# Patient Record
Sex: MALE | URBAN - METROPOLITAN AREA
[De-identification: names, ages, dates, MRNs, and addresses within clinical notes are randomized per-mention and may not be internally consistent; named-entity substitution may affect disease eponyms.]

---

## 2017-01-14 ENCOUNTER — OFFICE VISIT (OUTPATIENT)
Dept: AUDIOLOGY | Facility: CLINIC | Age: 57
End: 2017-01-14

## 2017-01-14 ENCOUNTER — OFFICE VISIT (OUTPATIENT)
Dept: OTOLARYNGOLOGY | Facility: CLINIC | Age: 57
End: 2017-01-14

## 2017-01-14 VITALS
DIASTOLIC BLOOD PRESSURE: 80 MMHG | SYSTOLIC BLOOD PRESSURE: 118 MMHG | BODY MASS INDEX: 24 KG/M2 | TEMPERATURE: 98 F | WEIGHT: 148 LBS

## 2017-01-14 DIAGNOSIS — H91.8X2 OTHER SPECIFIED HEARING LOSS OF LEFT EAR, UNSPECIFIED HEARING STATUS ON CONTRALATERAL SIDE: Primary | ICD-10-CM

## 2017-01-14 DIAGNOSIS — H93.12 TINNITUS, LEFT: ICD-10-CM

## 2017-01-14 DIAGNOSIS — H93.12 LEFT-SIDED TINNITUS: Primary | ICD-10-CM

## 2017-01-14 PROCEDURE — 99212 OFFICE O/P EST SF 10 MIN: CPT | Performed by: OTOLARYNGOLOGY

## 2017-01-14 PROCEDURE — 92557 COMPREHENSIVE HEARING TEST: CPT | Performed by: AUDIOLOGIST

## 2017-01-14 PROCEDURE — 99243 OFF/OP CNSLTJ NEW/EST LOW 30: CPT | Performed by: OTOLARYNGOLOGY

## 2017-01-14 RX ORDER — AZITHROMYCIN 250 MG/1
TABLET, FILM COATED ORAL
Refills: 0 | COMMUNITY
Start: 2017-01-03 | End: 2017-01-26 | Stop reason: ALTCHOICE

## 2017-01-14 RX ORDER — ATORVASTATIN CALCIUM 10 MG/1
5 TABLET, FILM COATED ORAL
Refills: 1 | COMMUNITY
Start: 2017-01-03

## 2017-01-14 RX ORDER — AMOXICILLIN AND CLAVULANATE POTASSIUM 875; 125 MG/1; MG/1
1 TABLET, FILM COATED ORAL EVERY 12 HOURS
Qty: 20 TABLET | Refills: 0 | Status: SHIPPED | OUTPATIENT
Start: 2017-01-14 | End: 2017-01-26 | Stop reason: ALTCHOICE

## 2017-01-14 NOTE — PROGRESS NOTES
AUDIOGRAM     Dejon Dowd was referred for testing by No ref. provider found. 4/7/1960  [id number]    History: Constant tinnitus in left ear for 12 days. He had an ear infection, still has some ear pain, left ear.   Otoscopic Inspection:  both ears: no

## 2017-01-16 NOTE — PATIENT INSTRUCTIONS
Understanding Hearing Loss    As you age, some hearing loss is normal. But long-term exposure to loud noise can speed up the loss. You lose more than the ability to hear how loud a sound is. You also lose the ability to hear certain types of sounds.  For

## 2017-01-16 NOTE — PROGRESS NOTES
Jason Flynn is a 49year old male. Patient presents with:  Ear Problem: Ringing L ear x 12 days. HPI:   He had an infectio with pain and drainage from the ear. There was bloody drainag Drainage has now sere is ringin.     Current Outpatient Prescription Submental. Submandibular. Anterior cervical. Posterior cervical. Supraclavicular.    Eyes Normal Conjunctiva - Right: Normal, Left: Normal. Pupil - Right: Normal, Left: Normal.    Ears Normal Inspection - Right: Normal, Left: Normal. Canal - Left: Normal. T

## 2017-01-18 ENCOUNTER — TELEPHONE (OUTPATIENT)
Dept: OTOLARYNGOLOGY | Facility: CLINIC | Age: 57
End: 2017-01-18

## 2017-01-18 NOTE — TELEPHONE ENCOUNTER
, pt seen on acute otitis media with perforation left ear on 01/14. Pt states taking antibiotics as prescribed and no improvement,r. Pt states you mentioned during last visit about removing fluid, myringotomy.  Please advise if pt should schedule re

## 2017-01-18 NOTE — TELEPHONE ENCOUNTER
Pt. States that he has been taking the antibiotic for the past week, but the ear infection is not getting better. Please call to discuss.

## 2017-01-19 ENCOUNTER — OFFICE VISIT (OUTPATIENT)
Dept: OTOLARYNGOLOGY | Facility: CLINIC | Age: 57
End: 2017-01-19

## 2017-01-19 ENCOUNTER — OFFICE VISIT (OUTPATIENT)
Dept: AUDIOLOGY | Facility: CLINIC | Age: 57
End: 2017-01-19

## 2017-01-19 VITALS
DIASTOLIC BLOOD PRESSURE: 80 MMHG | TEMPERATURE: 99 F | SYSTOLIC BLOOD PRESSURE: 130 MMHG | WEIGHT: 148 LBS | BODY MASS INDEX: 24 KG/M2

## 2017-01-19 DIAGNOSIS — H69.92 EUSTACHIAN TUBE DISORDER, LEFT: Primary | ICD-10-CM

## 2017-01-19 DIAGNOSIS — H91.8X2 OTHER SPECIFIED HEARING LOSS OF LEFT EAR, UNSPECIFIED HEARING STATUS ON CONTRALATERAL SIDE: Primary | ICD-10-CM

## 2017-01-19 PROCEDURE — 99213 OFFICE O/P EST LOW 20 MIN: CPT | Performed by: OTOLARYNGOLOGY

## 2017-01-19 PROCEDURE — 92567 TYMPANOMETRY: CPT | Performed by: AUDIOLOGIST

## 2017-01-19 PROCEDURE — 99212 OFFICE O/P EST SF 10 MIN: CPT | Performed by: OTOLARYNGOLOGY

## 2017-01-19 RX ORDER — CEFDINIR 300 MG/1
CAPSULE ORAL
Refills: 0 | COMMUNITY
Start: 2017-01-15 | End: 2017-01-26 | Stop reason: ALTCHOICE

## 2017-01-19 NOTE — TELEPHONE ENCOUNTER
, pt informed of your recommendations below, pt states ear is very uncomfortable, causing him to wake up in the middle of night,pt asking if he come tomorrow morning?  I tried advising that would be to soon per your recommendations and tried schedul

## 2017-01-19 NOTE — TELEPHONE ENCOUNTER
I just saw him a few days ago and put him on medication. It's going to take some time for this to work.  If she is really uncomfortable he can come in and we can do a myringotomy I would recommend waiting for a few more days at least

## 2017-01-20 NOTE — PROGRESS NOTES
Nena  is a 49year old male. Patient presents with:  Ear Problem: Left ear noises and popping, fluid behind eardrum    HPI:   He still feels his left ear is planned in that he is having popping and cracking.  He still feels a noise in his ears as wel Oriented to time, place, person & situation. Appropriate mood and affect. Lymph Detail Normal Submental. Submandibular. Anterior cervical. Posterior cervical. Supraclavicular.    Eyes Normal Conjunctiva - Right: Normal, Left: Normal. Pupil - Right: Normal

## 2017-01-24 NOTE — PROGRESS NOTES
Iris Ferrara is a 49year old male was referred for a left tympanogram by Dr Kasie Gupta. 4/7/1960  [id number]    Tympanogram  Impedance indicated a flat tympanogram consistent with reduced tympanic membrane mobility.   Ear canal volume was within normal li

## 2017-01-26 ENCOUNTER — TELEPHONE (OUTPATIENT)
Dept: OTOLARYNGOLOGY | Facility: CLINIC | Age: 57
End: 2017-01-26

## 2017-01-26 RX ORDER — CEFDINIR 300 MG/1
300 CAPSULE ORAL 2 TIMES DAILY
Qty: 20 CAPSULE | Refills: 0 | Status: SHIPPED | OUTPATIENT
Start: 2017-01-26 | End: 2017-02-05

## 2017-01-26 NOTE — TELEPHONE ENCOUNTER
Patient states that on LOV Dr Citlalli Ward prescribed antibiotics, states he has finished Rx yesterday 01/25 and still feels like there is fluid in ear. Requesting maybe a refill. Please call.  Thank you

## 2017-01-26 NOTE — TELEPHONE ENCOUNTER
Per huang Georges to send in refill for cefdinir 300 mg BID for 10 days. Pt informed and verbalized understanding.

## 2017-02-06 ENCOUNTER — TELEPHONE (OUTPATIENT)
Dept: OTOLARYNGOLOGY | Facility: CLINIC | Age: 57
End: 2017-02-06

## 2017-02-06 NOTE — TELEPHONE ENCOUNTER
Pt's LOV 1/19/17. Pt has completed 2 rounds of Cefdinir. Pt states his L ear is still popping, still feels like there is fluid behind his ear. Pt would like to wait about a week to see if symptoms clear up.   If not, he will contact our office to schedul

## 2017-02-16 ENCOUNTER — TELEPHONE (OUTPATIENT)
Dept: OTOLARYNGOLOGY | Facility: CLINIC | Age: 57
End: 2017-02-16

## 2017-02-16 NOTE — TELEPHONE ENCOUNTER
Pt's LOV 17, hearing loss of L ear. Pt has completed 2 rounds of Cefdinir, but still has c/o fluid in ear and popping in ear. Scheduled pt for f/u appt w/  for .   Pt wondering what recommendations  has for him, and wants to know if his sit

## 2017-02-16 NOTE — TELEPHONE ENCOUNTER
Pt informed of 's recommendation to drain the ear in office. Pt advised to keep appt for  w/ . Pt verbalized understanding.

## 2017-02-16 NOTE — TELEPHONE ENCOUNTER
Pt experiencing possible ear infection, left. . Pt refused soonest appt available.  LOV: 1/19/2017 Please advise, thank you. (pharmacy confirmed)

## 2017-02-21 ENCOUNTER — OFFICE VISIT (OUTPATIENT)
Dept: OTOLARYNGOLOGY | Facility: CLINIC | Age: 57
End: 2017-02-21

## 2017-02-21 ENCOUNTER — OFFICE VISIT (OUTPATIENT)
Dept: AUDIOLOGY | Facility: CLINIC | Age: 57
End: 2017-02-21

## 2017-02-21 VITALS
BODY MASS INDEX: 23.78 KG/M2 | WEIGHT: 148 LBS | TEMPERATURE: 98 F | HEIGHT: 66 IN | DIASTOLIC BLOOD PRESSURE: 92 MMHG | SYSTOLIC BLOOD PRESSURE: 136 MMHG

## 2017-02-21 DIAGNOSIS — H91.8X2 OTHER SPECIFIED HEARING LOSS OF LEFT EAR, UNSPECIFIED HEARING STATUS ON CONTRALATERAL SIDE: Primary | ICD-10-CM

## 2017-02-21 DIAGNOSIS — H90.42 SENSORINEURAL HEARING LOSS (SNHL) OF LEFT EAR WITH UNRESTRICTED HEARING OF RIGHT EAR: Primary | ICD-10-CM

## 2017-02-21 PROCEDURE — 99213 OFFICE O/P EST LOW 20 MIN: CPT | Performed by: OTOLARYNGOLOGY

## 2017-02-21 PROCEDURE — 92557 COMPREHENSIVE HEARING TEST: CPT | Performed by: AUDIOLOGIST

## 2017-02-21 PROCEDURE — 99212 OFFICE O/P EST SF 10 MIN: CPT | Performed by: OTOLARYNGOLOGY

## 2017-02-21 PROCEDURE — 92567 TYMPANOMETRY: CPT | Performed by: AUDIOLOGIST

## 2017-02-22 NOTE — PROGRESS NOTES
AUDIOGRAM     Dejon Johnson was referred for testing by Jose L Payan V for follow-up of left sided hearing loss. 4/7/1960  [id number]      Left ear testing only per Dr. Nikita Elias inspection: right ear no cerumen; left ear no cerumen.        Tests

## 2017-02-22 NOTE — PROGRESS NOTES
Louvenia Romberg is a 49year old male. Patient presents with:   Follow - Up: 1 month follow up- left ear hearing loss- per pt there is some improvement/changes of symptoms    HPI:   He feels that his left ear has been full and popping and cracking still he sti Normal.    Ears Normal Inspection - Right: Normal, Left: Normal. Canal - Left: Normal. TM - Right: Normal, Left: Normal.  Normal ear canals an audiogram.     ASSESSMENT AND PLAN:   Otitis media  The patient has finally resolved his otitis media.  I've recom

## 2017-10-09 PROBLEM — H43.811 VITREOUS DETACHMENT OF RIGHT EYE: Status: ACTIVE | Noted: 2017-10-09

## 2017-10-09 PROBLEM — H26.9 INCIPIENT CATARACT OF BOTH EYES: Status: ACTIVE | Noted: 2017-10-09

## (undated) NOTE — MR AVS SNAPSHOT
[address] [facility]  [phone]               Thank you for choosing us for your health care visit with Tomasz Garcia MD.  We are glad to serve you and happy to provide you with this summar [address] returned a non-compliant response. The tax information is out of balance. Commonly known as:  CORTISPORIN                   Follow-up Instructions     Return if symptoms worsen or fail to improve.          Savannat

## (undated) NOTE — MR AVS SNAPSHOT
[address] [facility]  [phone]               Thank you for choosing us for your health care visit with Tomasz Becerra MD.  We are glad to serve you and happy to provide you with this summar © 8253-9881 The [address] [place] [address], [address] [place]. All rights reserved. This information is not intended as a substitute for professional medical care. Always follow your healthcare professional's instructions.              Fo Fax:  [phone]    Diagnoses:  Other specified hearing loss of left ear, unspecified hearing status on contralateral side   Order:  Op Referral To Audiology        Comment:  Referral for Evaluation    Comprehensive Hearing Evaluation      Follow-up Inst

## (undated) NOTE — MR AVS SNAPSHOT
[place]  [address]  [phone]               Thank you for choosing us [facility].   We are glad to serve you and happy to provide you with this summar office, you can view your past visit information in [email or web address] by going to Visits < Visit Summaries. [email or web address] questions? Call [phone] for help. [email or web address] is NOT to be used for urgent needs. For medical emergencies, dial 911.            Visit EDWARD-JUNE

## (undated) NOTE — MR AVS SNAPSHOT
[address] [facility]  [phone]               Thank you for choosing us for your health care visit with Tomasz Wilhelm MD.  We are glad to serve you and happy to provide you with this summar Summaries. If you've been to the Emergency Department or your doctor's office, you can view your past visit information in [email or web address] by going to Visits < Visit Summaries. [email or web address] questions? Call [phone] for help.   [email or web address] is NOT to be used for urge

## (undated) NOTE — Clinical Note
Arvind Pinedo  [address] [place] [address]       01/16/2017        Patient: Andrew Chi   YOB: 1960   Date of Visit: 1/14/2017       Dear  Dr. Marko Ziegler,      Thank you for referring Andrew Chi to my prac

## (undated) NOTE — MR AVS SNAPSHOT
[address] [facility] [id number] [phone]               Thank you for choosing us for your health care visit with Alok Sears Benjamin [address], [address] [facility].   We are glad to serve you and happy to provide you with this mooney [employer] will allow you to access patient instructions from your recent visit,  view other health information, and more. To sign up or find more information, go to [email or web address] and click on the Sign Up Now link in the [employer] box.      Enter 2 ½ hours per week – spread out over time Use a shawna to keep you motivated   Don’t forget strength training with weights and resistance Set goals and track your progress   You don’t need to join a gym. Home exercises work great.  Put more priority on exe

## (undated) NOTE — MR AVS SNAPSHOT
[place]  [address]  [phone]               Thank you for choosing us for your health care visit with Fariba Lane.   We are glad to serve you and happy to provide you with this summary Please consider the following Lifestyle Modifications. Also, please return for a follow-up Blood Pressure Check in 1 month.      Lifestyle Modification Recommendations:    Modification Recommendation   Weight Reduction Maintain normal body weight (body mas